# Patient Record
Sex: MALE | Race: OTHER | NOT HISPANIC OR LATINO | URBAN - METROPOLITAN AREA
[De-identification: names, ages, dates, MRNs, and addresses within clinical notes are randomized per-mention and may not be internally consistent; named-entity substitution may affect disease eponyms.]

---

## 2024-04-13 ENCOUNTER — HOSPITAL ENCOUNTER (EMERGENCY)
Facility: HOSPITAL | Age: 49
Discharge: HOME | End: 2024-04-13
Attending: EMERGENCY MEDICINE
Payer: COMMERCIAL

## 2024-04-13 VITALS
TEMPERATURE: 97 F | RESPIRATION RATE: 17 BRPM | SYSTOLIC BLOOD PRESSURE: 173 MMHG | HEART RATE: 76 BPM | DIASTOLIC BLOOD PRESSURE: 94 MMHG | OXYGEN SATURATION: 98 %

## 2024-04-13 DIAGNOSIS — G51.0 BELL'S PALSY: Primary | ICD-10-CM

## 2024-04-13 PROCEDURE — 99284 EMERGENCY DEPT VISIT MOD MDM: CPT

## 2024-04-13 RX ORDER — PREDNISONE 20 MG/1
60 TABLET ORAL DAILY
Qty: 21 TABLET | Refills: 0 | Status: SHIPPED | OUTPATIENT
Start: 2024-04-13 | End: 2024-04-20

## 2024-04-13 RX ORDER — VALACYCLOVIR HYDROCHLORIDE 1 G/1
1000 TABLET, FILM COATED ORAL 3 TIMES DAILY
Qty: 21 TABLET | Refills: 0 | Status: SHIPPED | OUTPATIENT
Start: 2024-04-13 | End: 2024-04-20

## 2024-04-13 NOTE — ED PROVIDER NOTES
Encounter Date: 4/13/2024       History     Chief Complaint   Patient presents with    Facial Droop     Pt presents to ED today reports facial droop to right side and right side headache onset 0800  Denies difficulty speaking, walking, or unilateral weakness   Dr. Abraham called to triage for assessment      Patient is a 49-year-old male who noticed weakness to the right side of his face this morning.  He had slurred speech.  He denies any extremity numbness or weakness.  No lightheadedness or dizziness.  Patient denies feeling off balance when he walks.  No visual changes.  No headache.      Review of patient's allergies indicates:  Not on File  No past medical history on file.  No past surgical history on file.  No family history on file.     Review of Systems   Constitutional:  Negative for fever.   Eyes:  Negative for visual disturbance.   Respiratory:  Negative for shortness of breath.    Cardiovascular:  Negative for chest pain.   Neurological:  Positive for weakness. Negative for dizziness and numbness.       Physical Exam     Initial Vitals [04/13/24 1334]   BP Pulse Resp Temp SpO2   (!) 173/94 76 17 97.1 °F (36.2 °C) 98 %      MAP       --         Physical Exam    Nursing note and vitals reviewed.  Constitutional: No distress.   Eyes: EOM are normal.   Cardiovascular:  Normal rate, regular rhythm and normal heart sounds.           Pulmonary/Chest: Breath sounds normal.     Neurological: He is alert and oriented to person, place, and time.   Right-sided facial palsy which involves the forehead.  No extremity weakness.   Skin: Skin is warm and dry.   Psychiatric: Thought content normal.         ED Course   Procedures  Labs Reviewed - No data to display       Imaging Results    None          Medications - No data to display  Medical Decision Making  Emergent evaluation of a 49-year-old male presenting with a right-sided facial palsy that began early this morning.  There is obvious forehead involvement, clearly  indicative of Bell's palsy.  Patient is also unable to fully close his right eye.  He also 1st experienced pain of the right ear also clearly indicative of Weaver's palsy.  He has no extremity numbness or weakness.  No lightheadedness or dizziness.  No current need for imaging.  He will be placed on prednisone and Valtrex.  I have explained to the patient that this may take weeks to resolve.  He will follow-up with his primary physician when he returns to [place].  He may also return to the emergency department for any possible worsening.    Risk  Prescription drug management.                                      Clinical Impression:  Final diagnoses:  [G51.0] Bell's palsy (Primary)          ED Disposition Condition    Discharge Stable          ED Prescriptions       Medication Sig Dispense Start Date End Date Auth. Provider    predniSONE (DELTASONE) 20 MG tablet Take 3 tablets (60 mg total) by mouth once daily. for 7 days 21 tablet 4/13/2024 4/20/2024 Abdullahi Abraham MD    valACYclovir (VALTREX) 1000 MG tablet Take 1 tablet (1,000 mg total) by mouth 3 (three) times daily. for 7 days 21 tablet 4/13/2024 4/20/2024 Abdullahi Abraham MD          Follow-up Information       Follow up With Specialties Details Why Contact Info    Your primary doctor   As needed              Abdullahi Abraham MD  04/14/24 0612